# Patient Record
Sex: FEMALE | Race: WHITE
[De-identification: names, ages, dates, MRNs, and addresses within clinical notes are randomized per-mention and may not be internally consistent; named-entity substitution may affect disease eponyms.]

---

## 2018-12-11 ENCOUNTER — HOSPITAL ENCOUNTER (EMERGENCY)
Dept: HOSPITAL 25 - ED | Age: 28
Discharge: HOME | End: 2018-12-11
Payer: COMMERCIAL

## 2018-12-11 VITALS — DIASTOLIC BLOOD PRESSURE: 69 MMHG | SYSTOLIC BLOOD PRESSURE: 107 MMHG

## 2018-12-11 DIAGNOSIS — Z81.8: ICD-10-CM

## 2018-12-11 DIAGNOSIS — F39: Primary | ICD-10-CM

## 2018-12-11 DIAGNOSIS — F32.9: ICD-10-CM

## 2018-12-11 LAB
BASOPHILS # BLD AUTO: 0 10^3/UL (ref 0–0.2)
EOSINOPHIL # BLD AUTO: 0 10^3/UL (ref 0–0.6)
HCT VFR BLD AUTO: 39 % (ref 35–47)
HGB BLD-MCNC: 13.1 G/DL (ref 12–16)
LYMPHOCYTES # BLD AUTO: 0.5 10^3/UL (ref 1–4.8)
MCH RBC QN AUTO: 29 PG (ref 27–31)
MCHC RBC AUTO-ENTMCNC: 34 G/DL (ref 31–36)
MCV RBC AUTO: 87 FL (ref 80–97)
MONOCYTES # BLD AUTO: 0.4 10^3/UL (ref 0–0.8)
NEUTROPHILS # BLD AUTO: 5.8 10^3/UL (ref 1.5–7.7)
NRBC # BLD AUTO: 0 10^3/UL
NRBC BLD QL AUTO: 0
PLATELET # BLD AUTO: 162 10^3/UL (ref 150–450)
RBC # BLD AUTO: 4.47 10^6/UL (ref 4–5.4)
RBC UR QL AUTO: (no result)
WBC # BLD AUTO: 6.7 10^3/UL (ref 3.5–10.8)
WBC UR QL AUTO: (no result)

## 2018-12-11 PROCEDURE — 81015 MICROSCOPIC EXAM OF URINE: CPT

## 2018-12-11 PROCEDURE — 80329 ANALGESICS NON-OPIOID 1 OR 2: CPT

## 2018-12-11 PROCEDURE — 80320 DRUG SCREEN QUANTALCOHOLS: CPT

## 2018-12-11 PROCEDURE — 81003 URINALYSIS AUTO W/O SCOPE: CPT

## 2018-12-11 PROCEDURE — G0480 DRUG TEST DEF 1-7 CLASSES: HCPCS

## 2018-12-11 PROCEDURE — 84443 ASSAY THYROID STIM HORMONE: CPT

## 2018-12-11 PROCEDURE — 80307 DRUG TEST PRSMV CHEM ANLYZR: CPT

## 2018-12-11 PROCEDURE — 99284 EMERGENCY DEPT VISIT MOD MDM: CPT

## 2018-12-11 PROCEDURE — 85025 COMPLETE CBC W/AUTO DIFF WBC: CPT

## 2018-12-11 PROCEDURE — 87086 URINE CULTURE/COLONY COUNT: CPT

## 2018-12-11 PROCEDURE — 80053 COMPREHEN METABOLIC PANEL: CPT

## 2018-12-11 PROCEDURE — 36415 COLL VENOUS BLD VENIPUNCTURE: CPT
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
## 2018-12-11 NOTE — ED
Progress





- Progress Note


Progress Note: 





This pt was signed out by Dr. Padilla, pending disposition, awaiting mental 

health evaluation.





Pt had a mental health evaluation and her case was reviewed by Dr. Carlos, 

psychiatrist. Evaluator spoke with pt's Life Skills Aide and therapist and they 

agree with discharge. Dr. Carlos cleared the pt for discharge with outpatient 

follow up. Nyquil purchased by pt will be secured prior to her getting 

discharged. 





Re-Evaluation





- Re-Evaluation


  ** First Eval


Re-Evaluation Time: 11:08


Comment: Pt is medically cleared.





Course/Dx





- Diagnoses


Provider Diagnoses: 


 Mood disorder








Discharge





- Sign-Out/Discharge


Documenting (check all that apply): Patient Departure - Discharge home, 

Receiving Sign-Out


Receiving patient FROM: Kuldeep Padilla





- Discharge Plan


Condition: Stable


Disposition: HOME


Referrals: 


Bethanie Lim MD [Primary Care Provider] - 





- Attestation Statements


Document Initiated by Scribe: Yes


Documenting Scribe: Racheal Franklin


Provider For Whom Scribe is Documenting (Include Credential): Rodrigue Plata MD


Scribe Attestation: 


Racheal ROSE, scribed for Rodrigue Plata MD on 12/11/18 at 1637. 


Status of Scribe Document: Ready

## 2020-03-07 ENCOUNTER — HOSPITAL ENCOUNTER (EMERGENCY)
Dept: HOSPITAL 25 - ED | Age: 30
Discharge: HOME | End: 2020-03-07
Payer: COMMERCIAL

## 2020-03-07 VITALS — SYSTOLIC BLOOD PRESSURE: 113 MMHG | DIASTOLIC BLOOD PRESSURE: 78 MMHG

## 2020-03-07 DIAGNOSIS — Z79.899: ICD-10-CM

## 2020-03-07 DIAGNOSIS — R11.0: ICD-10-CM

## 2020-03-07 DIAGNOSIS — R10.31: Primary | ICD-10-CM

## 2020-03-07 LAB
ALBUMIN SERPL BCG-MCNC: 4.2 G/DL (ref 3.2–5.2)
ALBUMIN/GLOB SERPL: 1.4 {RATIO} (ref 1–3)
ALP SERPL-CCNC: 70 U/L (ref 34–104)
ALT SERPL W P-5'-P-CCNC: 15 U/L (ref 7–52)
ANION GAP SERPL CALC-SCNC: 8 MMOL/L (ref 2–11)
AST SERPL-CCNC: 17 U/L (ref 13–39)
BASOPHILS # BLD AUTO: 0 10^3/UL (ref 0–0.2)
BUN SERPL-MCNC: 10 MG/DL (ref 6–24)
BUN/CREAT SERPL: 14.3 (ref 8–20)
CALCIUM SERPL-MCNC: 9.2 MG/DL (ref 8.6–10.3)
CHLORIDE SERPL-SCNC: 110 MMOL/L (ref 101–111)
EOSINOPHIL # BLD AUTO: 0.2 10^3/UL (ref 0–0.6)
GLOBULIN SER CALC-MCNC: 2.9 G/DL (ref 2–4)
GLUCOSE SERPL-MCNC: 86 MG/DL (ref 70–100)
HCG SERPL QL: 0.6 MIU/ML
HCO3 SERPL-SCNC: 21 MMOL/L (ref 22–32)
HCT VFR BLD AUTO: 38 % (ref 35–47)
HGB BLD-MCNC: 12.9 G/DL (ref 12–16)
LYMPHOCYTES # BLD AUTO: 0.9 10^3/UL (ref 1–4.8)
MCH RBC QN AUTO: 29 PG (ref 27–31)
MCHC RBC AUTO-ENTMCNC: 34 G/DL (ref 31–36)
MCV RBC AUTO: 85 FL (ref 80–97)
MONOCYTES # BLD AUTO: 0.5 10^3/UL (ref 0–0.8)
NEUTROPHILS # BLD AUTO: 4.8 10^3/UL (ref 1.5–7.7)
NRBC # BLD AUTO: 0 10^3/UL
NRBC BLD QL AUTO: 0
PLATELET # BLD AUTO: 186 10^3/UL (ref 150–450)
POTASSIUM SERPL-SCNC: 4 MMOL/L (ref 3.5–5)
PROT SERPL-MCNC: 7.1 G/DL (ref 6.4–8.9)
RBC # BLD AUTO: 4.41 10^6 /UL (ref 3.7–4.87)
RBC UR QL AUTO: (no result)
SODIUM SERPL-SCNC: 139 MMOL/L (ref 135–145)
WBC # BLD AUTO: 6.4 10^3/UL (ref 3.5–10.8)
WBC UR QL AUTO: (no result)

## 2020-03-07 PROCEDURE — 96374 THER/PROPH/DIAG INJ IV PUSH: CPT

## 2020-03-07 PROCEDURE — 96376 TX/PRO/DX INJ SAME DRUG ADON: CPT

## 2020-03-07 PROCEDURE — 81015 MICROSCOPIC EXAM OF URINE: CPT

## 2020-03-07 PROCEDURE — 96361 HYDRATE IV INFUSION ADD-ON: CPT

## 2020-03-07 PROCEDURE — 87086 URINE CULTURE/COLONY COUNT: CPT

## 2020-03-07 PROCEDURE — 96375 TX/PRO/DX INJ NEW DRUG ADDON: CPT

## 2020-03-07 PROCEDURE — 76856 US EXAM PELVIC COMPLETE: CPT

## 2020-03-07 PROCEDURE — 85025 COMPLETE CBC W/AUTO DIFF WBC: CPT

## 2020-03-07 PROCEDURE — 80053 COMPREHEN METABOLIC PANEL: CPT

## 2020-03-07 PROCEDURE — 74177 CT ABD & PELVIS W/CONTRAST: CPT

## 2020-03-07 PROCEDURE — 81003 URINALYSIS AUTO W/O SCOPE: CPT

## 2020-03-07 PROCEDURE — 36415 COLL VENOUS BLD VENIPUNCTURE: CPT

## 2020-03-07 PROCEDURE — 99283 EMERGENCY DEPT VISIT LOW MDM: CPT

## 2020-03-07 PROCEDURE — 84702 CHORIONIC GONADOTROPIN TEST: CPT

## 2020-03-07 PROCEDURE — 86140 C-REACTIVE PROTEIN: CPT

## 2020-03-07 NOTE — ED
Abdominal Pain/Female





- HPI Summary


HPI Summary: 


29 year old F arriving via ambulance to [facility] complains of non-radiating, sharp 

RLQ pain rated 9/10 with nausea starting 0730 today 3/7/20. She states the pain 

started as an ache. Patient tried to eat. No dysuria, vomiting. She had similar 

pain 2 weeks ago which was less severe and resolved on its own. She has been on 

her period for 2 days now. Symptoms aggravated by nothing. Symptoms alleviated 

by nothing. She took ibuprofen which did not help the pain. She reports 

endometriosis removed 2 months ago. No hx gallstones, kidney stones, diabetes. 

Medications reviewed. Allergies noted.





 Home Medications











 Medication  Instructions  Recorded  Confirmed  Type


 


Eletriptan 40 mg (Nf)* [Relpax 40 - 80 mg PO DAILY PRN 12/11/18 03/07/20 History





(NF)]    


 


clonazePAM [Clonazepam] 0.5 mg PO BID PRN 03/07/20 03/07/20 History











- History of Current Complaint


Stated Complaint: ABDOMINAL PAIN PER EMS


Hx Obtained From: Patient


Onset/Duration: Lasting Hours - 3, Still Present


Timing: Constant


Severity Currently: Severe


Pain Intensity: 9


Pain Scale Used: 0-10 Numeric


Location: Discrete At: RLQ


Radiates: No


Aggravating Factor(s): Nothing


Alleviating Factor(s): Nothing


Associated Signs and Symptoms: Positive: Nausea.  Negative: Urinary Symptoms, 

Vomiting


Allergies/Adverse Reactions: 


 Allergies











Allergy/AdvReac Type Severity Reaction Status Date / Time


 


sumatriptan Allergy  Headache Verified 03/07/20 11:09











Home Medications: 


 Home Medications





Eletriptan 40 mg (Nf)* [Relpax (NF)] 40 - 80 mg PO DAILY PRN 12/11/18 [History 

Confirmed 03/07/20]


clonazePAM [Clonazepam] 0.5 mg PO BID PRN 03/07/20 [History Confirmed 03/07/20]











PMH/Surg Hx/FS Hx/Imm Hx


Cardiovascular History: 


   Denies: Hx Hypertension


 History: 


   Denies: Hx Kidney Stones


Musculoskeletal History: Reports: Hx Scoliosis, Other Musculoskeletal History - 

scoliosis, hypotonia


Sensory History: Reports: Hx Contacts or Glasses - contacts


Opthamlomology History: Reports: Hx Contacts or Glasses - contacts


Neurological History: Reports: Hx Migraine, Hx Seizures, Other Neuro Impairments

/Disorders - LE numbness, UE numbness


Psychiatric History: Reports: Hx Anxiety, Hx Autism - Asperger's, Hx Depression

, Hx Inpatient Treatment, Hx Community Mental Health Tx, Hx Schizophrenia, Hx 

Bipolar Disorder, Hx Suicide Attempt, Hx of Violent Episodes Against Others - 

history, Other Psychiatric Issues/Disorders - Asperger's


   Denies: Hx Eating Disorder





- Surgical History


Surgery Procedure, Year, and Place: HX MIGRAINES, ASBERGERS.  mass in sinuses 

removed.  eye surgery strabismus x2





- Family History


Known Family History: 


   Negative: Cardiac Disease, Hypertension, Diabetes


Family History: R & n/C





- Social History


Alcohol Use: None


Hx Substance Use: No


Substance Use Type: Reports: None


Hx Tobacco Use: No


Smoking Status (MU): Never Smoked Tobacco





Review of Systems


Positive: Abdominal Pain, Nausea.  Negative: Vomiting


Negative: dysuria


All Other Systems Reviewed And Are Negative: Yes





Physical Exam





- Summary


Physical Exam Summary: 





Constitutional: Well-developed, Well-nourished, Alert. (-) Distressed


Skin: Warm, Dry


HENT: Normocephalic; Atraumatic


Eyes: Conjunctiva normal


Neck: Musculoskeletal ROM normal neck. (-) JVD, (-) Stridor, (-) Tracheal 

deviation


Cardio: Rhythm regular, rate normal, Heart sounds normal; Intact distal pulses; 

The pedal pulses are 2+ and symmetric. Radial pulses are 2+ and symmetric. (-) 

Murmur


Pulmonary/Chest wall: Effort normal. (-) Respiratory distress, (-) Wheezes, (-) 

Rales


Abd: Soft, tenderness in right mid abdomen with mild distension, positive bowel 

sounds, (-) Guarding, (-) Rebound


Musculoskeletal: (-) Edema


Lymph: (-) Cervical adenopathy


Neuro: Alert, Oriented x3


Psych: Mood and affect Normal





Triage Information Reviewed: Yes


Vital Signs Reviewed: Yes





Procedures





- Sedation


Patient Received Moderate/Deep Sedation with Procedure: No





Diagnostics





- Laboratory


Result Diagrams: 


 03/07/20 11:05





 03/07/20 11:05


Lab Statement: Any lab studies that have been ordered have been reviewed, and 

results considered in the medical decision making process.





- CT


  ** ABD/PEL


CT Interpretation Completed By: Radiologist - IMPRESSION: #.  No abdominal 

pelvic pathologic process evident.  #. Normal appendix documented. ED physician 

has reviewed this imaging report.





- Ultrasound


  ** PELVIS


Ultrasound Interpretation Completed By: Radiologist - IMPRESSION:  #. Negative 

pelvic ultrasound. ED physician has reviewed this imaging report.





Re-Evaluation





- Re-Evaluation


  ** First Eval


Re-Evaluation Time: 12:04


Comment: patient still having pain per nurse.  will order morphine





  ** Second Eval


Re-Evaluation Time: 14:40


Comment: patient agrees to have CT ABD/PEL





  ** Third Eval


Re-Evaluation Time: 16:25


Change: Improved - patient agrees to d/c





Abdominal Pain Fem Course/Dx





- Course


Course Of Treatment: 30 y/o F c/o non-radiating, sharp RLQ pain rated 9/10 with 

nausea x3 hours. She had similar pain 2 weeks ago which was less severe and 

resolved on its own. She has been on her period for 2 days now. She took 

ibuprofen which did not help the pain. She reports endometriosis removed 2 

months ago. No hx gallstones, kidney stones.  Upon physical exam, the patient 

has tenderness in right mid abdomen with mild distension, positive bowel 

sounds.  Bloodwork results with no significant abnormalities.  Urinalysis 

results with no significant abnormalities.  Negative pelvic ultrasound.  CT ABD/

PEL shows  #.  No abdominal pelvic pathologic process evident.  #. Normal 

appendix documented.  In the ED course, the patient was given normal saline 

fluids, Toradol, Zofran.  The patient feels better. Patient will be discharged 

home with follow up from her primary care provider on Monday 3/9. Patient was 

instructed to return to Emergency Department for new or worsening symptoms. 

Patient understands and is agreeable to this plan.





- Diagnoses


Provider Diagnoses: 


 Abdominal pain








Discharge ED





- Sign-Out/Discharge


Documenting (check all that apply): Patient Departure





- Discharge Plan


Condition: Stable


Disposition: HOME


Patient Education Materials:  Abdominal Pain (ED)


Referrals: 


Bethanie Lim MD [Primary Care Provider] - 03/09/20


Additional Instructions: 


Please follow up with your primary care provider on Monday 3/9/20. Return to 

the Emergency Department for changing or worsening symptoms.





- Billing Disposition and Condition


Condition: STABLE


Disposition: Home





- Attestation Statements


Document Initiated by Scribe: Yes


Documenting Scribe: Bev Perkins


Provider For Whom Scribe is Documenting (Include Credential): Jn Rashid DO


Scribe Attestation: 


Bev ROSE, scribed for Jn Rashid DO on 03/07/20 at 1737. 


Scribe Documentation Reviewed: Yes


Provider Attestation: 


The documentation as recorded by the scribe, Bev Perkins accurately reflects 

the service I personally performed and the decisions made by me, Jn Rashid, DO


Status of Scribe Document: Viewed

## 2020-04-21 ENCOUNTER — HOSPITAL ENCOUNTER (INPATIENT)
Dept: HOSPITAL 25 - ED | Age: 30
LOS: 3 days | Discharge: HOME | DRG: 753 | End: 2020-04-24
Attending: PSYCHIATRY & NEUROLOGY | Admitting: PSYCHIATRY & NEUROLOGY
Payer: COMMERCIAL

## 2020-04-21 DIAGNOSIS — R45.851: ICD-10-CM

## 2020-04-21 DIAGNOSIS — F42.9: ICD-10-CM

## 2020-04-21 DIAGNOSIS — F39: Primary | ICD-10-CM

## 2020-04-21 DIAGNOSIS — F41.9: ICD-10-CM

## 2020-04-21 DIAGNOSIS — R45.850: ICD-10-CM

## 2020-04-21 DIAGNOSIS — Z81.1: ICD-10-CM

## 2020-04-21 DIAGNOSIS — F90.9: ICD-10-CM

## 2020-04-21 DIAGNOSIS — G43.909: ICD-10-CM

## 2020-04-21 DIAGNOSIS — J30.2: ICD-10-CM

## 2020-04-21 DIAGNOSIS — Z81.8: ICD-10-CM

## 2020-04-21 DIAGNOSIS — F32.9: ICD-10-CM

## 2020-04-21 DIAGNOSIS — F84.0: ICD-10-CM

## 2020-04-21 LAB
ALBUMIN SERPL BCG-MCNC: 4.5 G/DL (ref 3.2–5.2)
ALBUMIN/GLOB SERPL: 1.3 {RATIO} (ref 1–3)
ALP SERPL-CCNC: 73 U/L (ref 34–104)
ALT SERPL W P-5'-P-CCNC: 14 U/L (ref 7–52)
ANION GAP SERPL CALC-SCNC: 8 MMOL/L (ref 2–11)
APAP SERPL-MCNC: < 15 MCG/ML
AST SERPL-CCNC: 19 U/L (ref 13–39)
BASOPHILS # BLD AUTO: 0.1 10^3/UL (ref 0–0.2)
BUN SERPL-MCNC: 9 MG/DL (ref 6–24)
BUN/CREAT SERPL: 11 (ref 8–20)
CALCIUM SERPL-MCNC: 9.3 MG/DL (ref 8.6–10.3)
CHLORIDE SERPL-SCNC: 106 MMOL/L (ref 101–111)
EOSINOPHIL # BLD AUTO: 0 10^3/UL (ref 0–0.6)
GLOBULIN SER CALC-MCNC: 3.4 G/DL (ref 2–4)
GLUCOSE SERPL-MCNC: 97 MG/DL (ref 70–100)
HCG SERPL QL: < 0.6 MIU/ML
HCO3 SERPL-SCNC: 24 MMOL/L (ref 22–32)
HCT VFR BLD AUTO: 39 % (ref 35–47)
HGB BLD-MCNC: 13.2 G/DL (ref 12–16)
LYMPHOCYTES # BLD AUTO: 0.7 10^3/UL (ref 1–4.8)
MCH RBC QN AUTO: 29 PG (ref 27–31)
MCHC RBC AUTO-ENTMCNC: 34 G/DL (ref 31–36)
MCV RBC AUTO: 85 FL (ref 80–97)
MONOCYTES # BLD AUTO: 0.7 10^3/UL (ref 0–0.8)
NEUTROPHILS # BLD AUTO: 11.9 10^3/UL (ref 1.5–7.7)
NRBC # BLD AUTO: 0 10^3/UL
NRBC BLD QL AUTO: 0
PLATELET # BLD AUTO: 229 10^3/UL (ref 150–450)
POTASSIUM SERPL-SCNC: 3.6 MMOL/L (ref 3.5–5)
PROT SERPL-MCNC: 7.9 G/DL (ref 6.4–8.9)
RBC # BLD AUTO: 4.57 10^6 /UL (ref 3.7–4.87)
RBC UR QL AUTO: (no result)
SALICYLATES SERPL-MCNC: < 2.5 MG/DL (ref ?–30)
SODIUM SERPL-SCNC: 138 MMOL/L (ref 135–145)
TSH SERPL-ACNC: 0.77 MCIU/ML (ref 0.34–5.6)
WBC # BLD AUTO: 13.3 10^3/UL (ref 3.5–10.8)
WBC UR QL AUTO: (no result)

## 2020-04-21 PROCEDURE — 84702 CHORIONIC GONADOTROPIN TEST: CPT

## 2020-04-21 PROCEDURE — 99222 1ST HOSP IP/OBS MODERATE 55: CPT

## 2020-04-21 PROCEDURE — 80320 DRUG SCREEN QUANTALCOHOLS: CPT

## 2020-04-21 PROCEDURE — 81003 URINALYSIS AUTO W/O SCOPE: CPT

## 2020-04-21 PROCEDURE — 36415 COLL VENOUS BLD VENIPUNCTURE: CPT

## 2020-04-21 PROCEDURE — 84443 ASSAY THYROID STIM HORMONE: CPT

## 2020-04-21 PROCEDURE — 87086 URINE CULTURE/COLONY COUNT: CPT

## 2020-04-21 PROCEDURE — 80307 DRUG TEST PRSMV CHEM ANLYZR: CPT

## 2020-04-21 PROCEDURE — 90853 GROUP PSYCHOTHERAPY: CPT

## 2020-04-21 PROCEDURE — 99233 SBSQ HOSP IP/OBS HIGH 50: CPT

## 2020-04-21 PROCEDURE — 83036 HEMOGLOBIN GLYCOSYLATED A1C: CPT

## 2020-04-21 PROCEDURE — G0480 DRUG TEST DEF 1-7 CLASSES: HCPCS

## 2020-04-21 PROCEDURE — 80329 ANALGESICS NON-OPIOID 1 OR 2: CPT

## 2020-04-21 PROCEDURE — 80053 COMPREHEN METABOLIC PANEL: CPT

## 2020-04-21 PROCEDURE — 85025 COMPLETE CBC W/AUTO DIFF WBC: CPT

## 2020-04-21 PROCEDURE — 99238 HOSP IP/OBS DSCHRG MGMT 30/<: CPT

## 2020-04-21 PROCEDURE — 80061 LIPID PANEL: CPT

## 2020-04-21 PROCEDURE — 99284 EMERGENCY DEPT VISIT MOD MDM: CPT

## 2020-04-21 PROCEDURE — 81015 MICROSCOPIC EXAM OF URINE: CPT
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
## 2020-04-22 VITALS — DIASTOLIC BLOOD PRESSURE: 82 MMHG | SYSTOLIC BLOOD PRESSURE: 112 MMHG

## 2020-04-22 RX ADMIN — THERA TABS SCH: TAB at 11:23

## 2020-04-22 RX ADMIN — CLONAZEPAM PRN MG: 0.5 TABLET ORAL at 17:26

## 2020-04-22 RX ADMIN — ACETAMINOPHEN PRN MG: 325 TABLET ORAL at 21:18
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
## 2020-04-23 LAB
CHOLEST SERPL-MCNC: 121 MG/DL
HDLC SERPL-MCNC: 53.4 MG/DL
TRIGL SERPL-MCNC: 36 MG/DL

## 2020-04-23 RX ADMIN — ACETAMINOPHEN PRN MG: 325 TABLET ORAL at 20:35

## 2020-04-23 RX ADMIN — THERA TABS SCH TAB: TAB at 10:55
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
## 2020-04-23 NOTE — PN
BSU: Group Therapy Note





- Service Type


Service Type: 77920 Group Psychotherapy - Cognitive Behavioral Group Therapy (

CBT):Patient was attentive and participatory in CBT programming this morning, 

and remained in good behavioral control.  Patient expressed positive insights 

regarding relevant treatment interventions and goals.

## 2020-04-24 RX ADMIN — THERA TABS SCH TAB: TAB at 08:46

## 2020-04-24 RX ADMIN — CLONAZEPAM PRN MG: 0.5 TABLET ORAL at 12:39
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
